# Patient Record
Sex: MALE | URBAN - METROPOLITAN AREA
[De-identification: names, ages, dates, MRNs, and addresses within clinical notes are randomized per-mention and may not be internally consistent; named-entity substitution may affect disease eponyms.]

---

## 2019-08-30 ENCOUNTER — TRANSFERRED RECORDS (OUTPATIENT)
Dept: HEALTH INFORMATION MANAGEMENT | Facility: CLINIC | Age: 69
End: 2019-08-30

## 2019-08-30 LAB
CHOLEST SERPL-MCNC: 89 MG/DL
HDLC SERPL-MCNC: 45 MG/DL
LDLC SERPL CALC-MCNC: 33 MG/DL
TRIGL SERPL-MCNC: 57 MG/DL

## 2019-09-04 ENCOUNTER — TRANSFERRED RECORDS (OUTPATIENT)
Dept: HEALTH INFORMATION MANAGEMENT | Facility: CLINIC | Age: 69
End: 2019-09-04

## 2020-03-02 LAB
ALBUMIN (URINE) MG/SPEC: <0.7 MG/DL
ALBUMIN/CREATININE RATIO: <12.2
CREATININE (URINE): 57.4 MG/DL

## 2020-04-02 ENCOUNTER — TRANSFERRED RECORDS (OUTPATIENT)
Dept: HEALTH INFORMATION MANAGEMENT | Facility: CLINIC | Age: 70
End: 2020-04-02

## 2020-07-20 ENCOUNTER — TRANSFERRED RECORDS (OUTPATIENT)
Dept: HEALTH INFORMATION MANAGEMENT | Facility: CLINIC | Age: 70
End: 2020-07-20

## 2020-08-11 ENCOUNTER — TRANSFERRED RECORDS (OUTPATIENT)
Dept: HEALTH INFORMATION MANAGEMENT | Facility: CLINIC | Age: 70
End: 2020-08-11

## 2020-09-01 ENCOUNTER — TRANSFERRED RECORDS (OUTPATIENT)
Dept: HEALTH INFORMATION MANAGEMENT | Facility: CLINIC | Age: 70
End: 2020-09-01

## 2021-03-21 ENCOUNTER — TRANSFERRED RECORDS (OUTPATIENT)
Dept: HEALTH INFORMATION MANAGEMENT | Facility: CLINIC | Age: 71
End: 2021-03-21

## 2021-12-27 PROBLEM — C61 ADENOCARCINOMA OF PROSTATE (H): Status: ACTIVE | Noted: 2020-03-19

## 2022-08-29 ENCOUNTER — TELEPHONE (OUTPATIENT)
Dept: TRANSPLANT | Facility: CLINIC | Age: 72
End: 2022-08-29

## 2022-08-29 NOTE — TELEPHONE ENCOUNTER
----- Message from Andra Romano RN sent at 8/25/2022  6:21 AM CDT -----  Hello,  Data abstractors are working to transfer patients who are followed at other centers to those other centers. This patient could not be transferred, as either the transfer center was not listed or the center listed did not accept the transfer.    Please follow up with the patient and update the transplant tab with the transplant center the patient follows with.   If the patient does not follow with a transplant center, please candy the chart as 'not followed clinically' - patient choice.    This is due Monday 8/29.    Thank you!

## 2022-08-29 NOTE — TELEPHONE ENCOUNTER
RNCC spoke with Adrian Piña, he reports he follows at the [facility].

## 2022-11-14 ENCOUNTER — TRANSFERRED RECORDS (OUTPATIENT)
Dept: HEALTH INFORMATION MANAGEMENT | Facility: CLINIC | Age: 72
End: 2022-11-14

## 2023-02-06 ENCOUNTER — TRANSFERRED RECORDS (OUTPATIENT)
Dept: HEALTH INFORMATION MANAGEMENT | Facility: CLINIC | Age: 73
End: 2023-02-06

## 2023-11-06 ENCOUNTER — TRANSFERRED RECORDS (OUTPATIENT)
Dept: HEALTH INFORMATION MANAGEMENT | Facility: CLINIC | Age: 73
End: 2023-11-06

## 2024-06-14 ENCOUNTER — TRANSFERRED RECORDS (OUTPATIENT)
Dept: HEALTH INFORMATION MANAGEMENT | Facility: CLINIC | Age: 74
End: 2024-06-14

## 2025-07-10 ENCOUNTER — TELEPHONE (OUTPATIENT)
Dept: TRANSPLANT | Facility: CLINIC | Age: 75
End: 2025-07-10

## 2025-07-10 NOTE — TELEPHONE ENCOUNTER
Patient called wanting to speak with a coordinator. He is having issues with his enzymes. Patient would like a call back as soon as possible. No other details provided.

## 2025-07-10 NOTE — TELEPHONE ENCOUNTER
Adrian called as he has been experiencing intense burning-like pain associated with his urinary tract. [place] follows his [facility] solely for immunosuppression management.   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  Infectious work up has been negative.  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 Cystoscopy into the bladder incomplete due to stricture.   Denies any hematuria.  He was prescribed azo q8h, which does improve the pain slightly, but pain remains severe.    Kidney and pancreas remain functional. Denies any use of insulin.         Due to Phone Visit, verbal consent received for Care Everywhere Authorization from the patient during this visit.        Upon chart review, Adrian has had recurrent UTI and is well known to urology and established with [facility]. At present, no infectious process, but has standing orders for treatment (rather than prophylaxis), should he develop UTI.   [facility] SOT team is following and plans to reach out to Adrian regarding potential enteric conversion after he sees urology for urethroplasty (currently scheduled for 7/24).  At time of writing, Adrian also had a call out to [facility] to determine if any additional pain management is possible.    Given current managing plan, [facility] S[facility] would encourage Adrian to continue plan per [facility]'s multi-disciplinary team.     PCS provided above recommendation to Adrian and did advise that if SOT team is unable to provide any additional recommendations for pain management between now and the ureteroplasty, please reach out to urology to request additional pain management plan.      Additionally, message left for Effie ([facility]) to confirm their team is aware of antibiotic treatment plan, should Adrian develop UTI (per ID notes, DS Bactrim BID [id number]).